# Patient Record
Sex: MALE | Race: BLACK OR AFRICAN AMERICAN | NOT HISPANIC OR LATINO | URBAN - METROPOLITAN AREA
[De-identification: names, ages, dates, MRNs, and addresses within clinical notes are randomized per-mention and may not be internally consistent; named-entity substitution may affect disease eponyms.]

---

## 2021-02-05 ENCOUNTER — EMERGENCY (EMERGENCY)
Facility: HOSPITAL | Age: 21
LOS: 1 days | Discharge: HOME | End: 2021-02-05
Payer: COMMERCIAL

## 2021-02-05 VITALS
DIASTOLIC BLOOD PRESSURE: 75 MMHG | OXYGEN SATURATION: 97 % | RESPIRATION RATE: 18 BRPM | SYSTOLIC BLOOD PRESSURE: 114 MMHG | HEART RATE: 76 BPM | TEMPERATURE: 99 F

## 2021-02-05 LAB — SARS-COV-2 RNA SPEC QL NAA+PROBE: DETECTED

## 2021-02-05 PROCEDURE — U0005: CPT

## 2021-02-05 PROCEDURE — 99283 EMERGENCY DEPT VISIT LOW MDM: CPT

## 2021-02-05 PROCEDURE — 99282 EMERGENCY DEPT VISIT SF MDM: CPT

## 2021-02-05 PROCEDURE — U0003: CPT

## 2021-02-05 NOTE — ED PROVIDER NOTE - CLINICAL SUMMARY MEDICAL DECISION MAKING FREE TEXT BOX
Pt nontoxic appearing, stable vitals, ambulatory with stable saturation without supplemental oxygen. PT does not meet criteria listed in most updated guidelines as per [facility] protocol/algorithm for admission at this time. pt advised about self-quarantine instructions until negative test results and/or symptom resolution. pt advised on hand hygiene, monitoring of symptoms, antipyretic use as well as and fu with primary care provider. Instructions given in pre-printed copy.

## 2024-12-12 ENCOUNTER — OFFICE (OUTPATIENT)
Dept: URBAN - METROPOLITAN AREA CLINIC 94 | Facility: CLINIC | Age: 24
Setting detail: OPHTHALMOLOGY
End: 2024-12-12
Payer: COMMERCIAL

## 2024-12-12 DIAGNOSIS — D31.00: ICD-10-CM

## 2024-12-12 PROCEDURE — 92014 COMPRE OPH EXAM EST PT 1/>: CPT | Performed by: OPHTHALMOLOGY

## 2024-12-12 ASSESSMENT — KERATOMETRY
OD_K1POWER_DIOPTERS: 42.00
OS_K1POWER_DIOPTERS: 42.25
OS_AXISANGLE_DEGREES: 079
OD_AXISANGLE_DEGREES: 102
OD_K2POWER_DIOPTERS: 43.50
OS_K2POWER_DIOPTERS: 43.75

## 2024-12-12 ASSESSMENT — VISUAL ACUITY
OS_BCVA: 20/20
OD_BCVA: 20/20

## 2024-12-12 ASSESSMENT — REFRACTION_AUTOREFRACTION
OS_AXIS: 010
OD_AXIS: 017
OD_CYLINDER: -0.75
OS_SPHERE: 0.00
OD_SPHERE: +0.75
OS_CYLINDER: -0.50

## 2024-12-12 ASSESSMENT — CONFRONTATIONAL VISUAL FIELD TEST (CVF)
OD_FINDINGS: FULL
OS_FINDINGS: FULL

## 2024-12-12 ASSESSMENT — TONOMETRY
OS_IOP_MMHG: 12
OD_IOP_MMHG: 12

## 2025-07-13 ENCOUNTER — EMERGENCY (EMERGENCY)
Facility: HOSPITAL | Age: 25
LOS: 1 days | End: 2025-07-13
Attending: EMERGENCY MEDICINE
Payer: COMMERCIAL

## 2025-07-13 VITALS
TEMPERATURE: 98 F | HEART RATE: 72 BPM | OXYGEN SATURATION: 100 % | RESPIRATION RATE: 14 BRPM | DIASTOLIC BLOOD PRESSURE: 73 MMHG | SYSTOLIC BLOOD PRESSURE: 115 MMHG

## 2025-07-13 PROCEDURE — 99284 EMERGENCY DEPT VISIT MOD MDM: CPT

## 2025-07-13 NOTE — ED ADULT TRIAGE NOTE - CHIEF COMPLAINT QUOTE
rash to arms and spreading to torso and neck after doing yardwork yesteray, itchy, no fever or chills

## 2025-07-14 PROCEDURE — 99283 EMERGENCY DEPT VISIT LOW MDM: CPT

## 2025-07-14 RX ORDER — DIPHENHYDRAMINE HCL 12.5MG/5ML
1 ELIXIR ORAL
Qty: 20 | Refills: 0
Start: 2025-07-14

## 2025-07-14 RX ORDER — PREDNISONE 20 MG/1
4 TABLET ORAL
Qty: 35 | Refills: 0
Start: 2025-07-14

## 2025-07-14 RX ORDER — PREDNISONE 20 MG/1
50 TABLET ORAL ONCE
Refills: 0 | Status: COMPLETED | OUTPATIENT
Start: 2025-07-14 | End: 2025-07-14

## 2025-07-14 RX ADMIN — PREDNISONE 50 MILLIGRAM(S): 20 TABLET ORAL at 00:36

## 2025-07-14 NOTE — ED PROVIDER NOTE - ATTENDING APP SHARED VISIT CONTRIBUTION OF CARE
I, Sukhdev Cespedes, performed a face to face bedside interview with this patient regarding history of present illness, and completed an independent physical examination. I personally made/approved the management plan and take responsibility for the patient management. I have communicated the patient’s plan of care and disposition with the ACP.  25 year old presents with rash x 2 days after working outside. no oral involvement  Gen: NAD, well appearing  CV: RRR  Pul: CTA b/l  Abd: Soft, non-distended, non-tender  Neuro: no focal deficits  skin: contact dermatitis to b/l arms and torso  likely poison ivy/contact dermatitis. No mucus membrane involvement, sliughign, or systemic illness, stable for dc and outpt supportive care

## 2025-07-14 NOTE — ED PROVIDER NOTE - PATIENT PORTAL LINK FT
You can access the FollowMyHealth Patient Portal offered by [facility] by registering at the following website: [email or web address]. By joining [facility]’s FollowMyHealth portal, you will also be able to view your health information using other applications (apps) compatible with our system.

## 2025-07-14 NOTE — ED PROVIDER NOTE - OBJECTIVE STATEMENT
26 yo M hx ADHD presents to ED r/o itchy rash over the past 2 days, started after was outdoors doing yard work. noted some bumps to bilateral arms which have worsened and now with spots on chest and face. has been using topical benadryl solution and hydrocortisone without relief. denies any fever or other new or worsening symptoms. no known allergies.

## 2025-07-14 NOTE — ED PROVIDER NOTE - NSFOLLOWUPINSTRUCTIONS_ED_ALL_ED_FT
Please take medications as directed  No driving while taking benadryl  Please follow up with primary care doctor   Return to ER for any new or worsening symptoms    Poison Ivy Dermatitis    Poison ivy dermatitis is inflammation of the skin that is caused by chemicals in the leaves of the poison ivy plant. The skin reaction often involves redness, swelling, blisters, and extreme itching.    What are the causes?  This condition is caused by a chemical (urushiol) found in the sap of the poison ivy plant. This chemical is sticky and can be easily spread to people, animals, and objects. You can get poison ivy dermatitis by:    Having direct contact with a poison ivy plant.  Touching animals, other people, or objects that have come in contact with poison ivy and have the chemical on them.    What increases the risk?  This condition is more likely to develop in people who:    Are outdoors often in wooded or marshy areas.  Go outdoors without wearing protective clothing, such as closed shoes, long pants, and a long-sleeved shirt.    What are the signs or symptoms?     Symptoms of this condition include:    Redness of the skin.  Extreme itching.  A rash that often includes bumps and blisters. The rash usually appears 48 hours after exposure, if you have been exposed before. If this is the first time you have been exposed, the rash may not appear until a week after exposure.  Swelling. This may occur if the reaction is more severe.    Symptoms usually last for 1–2 weeks. However, the first time you develop this condition, symptoms may last 3–4 weeks.    How is this diagnosed?  This condition may be diagnosed based on your symptoms and a physical exam. Your health care provider may also ask you about any recent outdoor activity.    How is this treated?  Treatment for this condition will vary depending on how severe it is. Treatment may include:    Hydrocortisone cream or calamine lotion to relieve itching.  Oatmeal baths to soothe the skin.  Medicines, such as over-the-counter antihistamine tablets.  Oral steroid medicine, for more severe reactions.    Follow these instructions at home:      Medicines    Take or apply over-the-counter and prescription medicines only as told by your health care provider.  Use hydrocortisone cream or calamine lotion as needed to soothe the skin and relieve itching.        General instructions    Do not scratch or rub your skin.  Apply a cold, wet cloth (cold compress) to the affected areas or take baths in cool water. This will help with itching. Avoid hot baths and showers.  Take oatmeal baths as needed. Use colloidal oatmeal. You can get this at your local pharmacy or grocery store. Follow the instructions on the packaging.  While you have the rash, wash clothes right after you wear them.  Keep all follow-up visits as told by your health care provider. This is important.    How is this prevented?     Learn to identify the poison ivy plant and avoid contact with the plant. This plant can be recognized by the number of leaves. Generally, poison ivy has three leaves with flowering branches on a single stem. The leaves are typically glossy, and they have jagged edges that come to a point at the front.  If you have been exposed to poison ivy, thoroughly wash with soap and water right away. You have about 30 minutes to remove the plant resin before it will cause the rash. Be sure to wash under your fingernails, because any plant resin there will continue to spread the rash.  When hiking or camping, wear clothes that will help you to avoid exposure on the skin. This includes long pants, a long-sleeved shirt, tall socks, and hiking boots. You can also apply preventive lotion to your skin to help limit exposure.  If you suspect that your clothes or outdoor gear came in contact with poison ivy, rinse them off outside with a garden hose before you bring them inside your house.  When doing yard work or gardening, wear gloves, long sleeves, long pants, and boots. Wash your garden tools and gloves if they come in contact with poison ivy.  If you suspect that your pet has come into contact with poison ivy, wash him or her with pet shampoo and water. Make sure to wear gloves while washing your pet.    Contact a health care provider if you have:  Open sores in the rash area.  More redness, swelling, or pain in the affected area.  Redness that spreads beyond the rash area.  Fluid, blood, or pus coming from the affected area.  A fever.  A rash over a large area of your body.  A rash on your eyes, mouth, or genitals.  A rash that does not improve after a few weeks.    Get help right away if:  Your face swells or your eyes swell shut.  You have trouble breathing.  You have trouble swallowing.    These symptoms may represent a serious problem that is an emergency. Do not wait to see if the symptoms will go away. Get medical help right away. Call your local emergency services (911 in the [place] Do not drive yourself to the hospital.    Summary  Poison ivy dermatitis is inflammation of the skin that is caused by chemicals in the leaves of the poison ivy plant.  Symptoms of this condition include redness, itching, a rash, and swelling.  Do not scratch or rub your skin.  Take or apply over-the-counter and prescription medicines only as told by your health care provider.

## 2025-07-14 NOTE — ED PROVIDER NOTE - CLINICAL SUMMARY MEDICAL DECISION MAKING FREE TEXT BOX
26 yo M hx ADHD presents to ED r/o itchy rash over the past 2 days, started after was outdoors doing yard work. noted some bumps to bilateral arms which have worsened and now with spots on chest and face. has been using topical tx without relief. will start on oral steroids, discussed oral benadryl prn for itching, f/u PCP. return precautions

## 2025-07-14 NOTE — ED PROVIDER NOTE - PHYSICAL EXAMINATION
Gen: No acute distress, non toxic  HEENT: Mucous membranes moist, pink conjunctivae, EOMI  Neuro: A&O x 3, moving all 4 extremities  MSK: No spine or joint tenderness to palpation  Skin: erythematous papular rash bilateral arms, chest, forehead . no involvement of palms or soles